# Patient Record
(demographics unavailable — no encounter records)

---

## 2024-11-04 NOTE — PHYSICAL EXAM
[Alert] : alert [No Acute Distress] : no acute distress [Well Developed] : well developed [EOMI] : extra ocular movement intact [No Proptosis] : no proptosis [No Lid Lag] : no lid lag [No LAD] : no lymphadenopathy [Thyroid Not Enlarged] : the thyroid was not enlarged [No Thyroid Nodules] : no palpable thyroid nodules [No Respiratory Distress] : no respiratory distress [No Accessory Muscle Use] : no accessory muscle use [Normal Rate and Effort] : normal respiratory rate and effort [Normal Gait] : normal gait [No Involuntary Movements] : no involuntary movements were seen [No Tremors] : no tremors [Oriented x3] : oriented to person, place, and time [de-identified] : seems to have pressured speech, wearing star sticker on her forehead

## 2024-11-04 NOTE — PHYSICAL EXAM
[Alert] : alert [No Acute Distress] : no acute distress [Well Developed] : well developed [EOMI] : extra ocular movement intact [No Proptosis] : no proptosis [No Lid Lag] : no lid lag [No LAD] : no lymphadenopathy [Thyroid Not Enlarged] : the thyroid was not enlarged [No Thyroid Nodules] : no palpable thyroid nodules [No Respiratory Distress] : no respiratory distress [No Accessory Muscle Use] : no accessory muscle use [Normal Rate and Effort] : normal respiratory rate and effort [Normal Gait] : normal gait [No Involuntary Movements] : no involuntary movements were seen [No Tremors] : no tremors [Oriented x3] : oriented to person, place, and time [de-identified] : seems to have pressured speech, wearing star sticker on her forehead

## 2024-11-04 NOTE — HISTORY OF PRESENT ILLNESS
[FreeTextEntry1] : SANDRO LAINEZ  is a 43 year old female with past medical history of bipolar disorder, anxiety, HTN, HLD, hypothyroidism who presents today for management of hypothyroidism  Patient notes she was diagnosed with hypothyroidism in [place] 5 years ago, and has been on levothyroxine 100mcg daily. Today she notes she is told by another doctor that her TSH in Sept 2024 is abnormal however as per chart review noted TSH is in normal reference range. Patient started speaking loudly and insisted to repeat again today in office  She is on levothyroxine 100mcg daily and states compliance.  She has  h/o prediabetes however not on Metformin Patient denies heat or cold intolerance, notes  dysphagia, dysphonia, notes diarrhea. She notes anxiety, denies palpitations, denies tremors/shaking of extremities and has insomnia. She did thyroid sonogram but no results to review today

## 2024-11-04 NOTE — ASSESSMENT
[Levothyroxine] : The patient was instructed to take Levothyroxine on an empty stomach, separate from vitamins, and wait at least 30 minutes before eating [FreeTextEntry1] : Patient here for evalution of hypothyroidism Diagnosed 5 years ago in [place] however does not have any previous records with her to review today Dicsussed levothyroxine is a life-long medication and consequences of stopping medication. She has underlying anxiety and bipolar disorder Currently on levothyroxine 100mcg daily Can consider referral to [facility] weight management if she continues to gain weight Reportedly has remote h/o thyroid nodules, will get sonogram results  for further evaluation  h/o prediabetes Will check serum A1c  Not on metformin Counseled regarding diet and exercise in terms of lifestyle modifications  Answered all questions today; patient verbalized understanding of the above RTO in 6 months/once provider returns from maternity leave

## 2024-12-04 NOTE — ASSESSMENT
[FreeTextEntry1] : A Breath test was administered by [facility]  Continue pantoprazole 40 mg daily in the morning Start famotidine 40 mg in the evening Increase dicyclomine to 40 mg 3 times a day 1/2 hour before meals  Office follow-up in 1 month  I spent 33 minutes with the patient and answered all of her question

## 2024-12-04 NOTE — HISTORY OF PRESENT ILLNESS
[FreeTextEntry1] : Recent colonoscopy was normal.  She admits to increased stress in her life.  She still complains of heartburn and chest pain despite being on pantoprazole 40 mg daily.  She had an endoscopy in July which was normal.  Routine biopsies were positive for Helicobacter pylori.  She was treated with antibiotics and with a follow-up Breath test which remained positive.  She was then treated with a different set of antibiotics in August but did not have a follow-up Breath test yet.  She also admits to irregular bowel habits with constipation alternating with diarrhea.  She also admits to crampy lower abdominal pain.  She is on dicyclomine 20 mg 3 times a day

## 2024-12-04 NOTE — PHYSICAL EXAM
[Normal] : no respiratory distress, no accessory muscle use, normal respiratory rhythm and effort, lungs were clear to auscultation bilaterally [RLQ] : RLQ [LLQ] : LLQ [Epigastric] : epigastric

## 2024-12-04 NOTE — REVIEW OF SYSTEMS
[Chest Pain] : chest pain [Abdominal Pain] : abdominal pain [Constipation] : constipation [Diarrhea] : diarrhea [Heartburn] : heartburn [Negative] : Heme/Lymph

## 2024-12-12 NOTE — PLAN
[FreeTextEntry1] : HTN - controlled, c/w losartan 50 HLD/Pre-DM - check lipid panel Hypothyroidism - check labs, following w/ endocrinology, c/w synthroid GERD/ H. pylori infection - following w/ GI Bipolar disorder - follow-up with psychiatry

## 2024-12-12 NOTE — HISTORY OF PRESENT ILLNESS
[FreeTextEntry1] : follow-up, HTN, Hypothyroidism, Bipolar disorder [de-identified] : 43 year old female here for a f/u visit. Currently she has no acute medical complaints. She denies any chest pain, shortness of breath or cough.

## 2024-12-12 NOTE — HISTORY OF PRESENT ILLNESS
[FreeTextEntry1] : follow-up, HTN, Hypothyroidism, Bipolar disorder [de-identified] : 43 year old female here for a f/u visit. Currently she has no acute medical complaints. She denies any chest pain, shortness of breath or cough.

## 2025-03-13 NOTE — HISTORY OF PRESENT ILLNESS
[FreeTextEntry1] : This is a 42 y/o female with a pmhx of HLD, HTN, Hypothyroid, PreDM, Bipolar Depression, Anxiety, OMERO, Asthma, Obesity who presents for a follow up. Patient was last seen 12/2024, reporting chest pain, advised for STN. Patient continues to report palpitations. She also reports headaches. Patient also reports elevated BP at home. She also reports feeling hot and sweaty. She also reports dizziness when taking her seroquel and ambien.

## 2025-04-04 NOTE — HISTORY OF PRESENT ILLNESS
[FreeTextEntry1] : f/u [de-identified] : Ms. LAINEZ is a 43 year old F with PMHx of HLD, HTN, Hypothyroid, PreDM, Bipolar Depression, Anxiety, OMERO, Asthma, Obesity presenting for f/u.  Denies chest pain, sob, sparks, dizziness, diaphoresis, palpitations, LE swelling, orthopnea, syncope, n/v, headache.

## 2025-04-04 NOTE — ADDENDUM
[FreeTextEntry1] : This note was written by Marbella Garza on 04/04/2025 acting as medical scribe for Dr. Kiara Blankenship. I, Dr. Kiara Blankenship, have read and attest that all the information, medical decision making and discharge instructions within are true and accurate.

## 2025-04-04 NOTE — HEALTH RISK ASSESSMENT
[0] : 2) Feeling down, depressed, or hopeless: Not at all (0) [PHQ-2 Negative - No further assessment needed] : PHQ-2 Negative - No further assessment needed [Never] : Never [[id number]] : 0

## 2025-04-04 NOTE — HISTORY OF PRESENT ILLNESS
[FreeTextEntry1] : f/u [de-identified] : Ms. LAINEZ is a 43 year old F with PMHx of HLD, HTN, Hypothyroid, PreDM, Bipolar Depression, Anxiety, OMERO, Asthma, Obesity presenting for f/u.  Denies chest pain, sob, sparks, dizziness, diaphoresis, palpitations, LE swelling, orthopnea, syncope, n/v, headache.

## 2025-04-04 NOTE — HEALTH RISK ASSESSMENT
[0] : 2) Feeling down, depressed, or hopeless: Not at all (0) [PHQ-2 Negative - No further assessment needed] : PHQ-2 Negative - No further assessment needed [Never] : Never [[id number]] : 0

## 2025-04-17 NOTE — HISTORY OF PRESENT ILLNESS
[FreeTextEntry1] : pt presents for f/u pt still with chest discomfort .pt with anxiety pt denies any  dizziness ,lightheadedness ,nausea vomiting diaphoresis

## 2025-05-27 NOTE — HISTORY OF PRESENT ILLNESS
[FreeTextEntry1] : This is a 44 y/o female with a pmhx of HLD, HTN, Hypothyroid, PreDM, Bipolar Depression, Anxiety, OMERO, Asthma, Obesity who presents for a follow up and clearance for endoscopy. Last OV 04/17/2025 with chest pain. advised to start crestor 10 mg daily during last OV, but has not started yet. Patient declines to start statin. s/p STN 05/23/2025. She continues to report of shortness of breath and anxiety. Denies any chest pain now.  Denies chest pain, palpitations, diaphoresis, vision changes, HA, dizziness, syncope, cough, wheezing,  edema, fever, chills, infection.